# Patient Record
Sex: MALE | Race: WHITE | NOT HISPANIC OR LATINO | Employment: FULL TIME | URBAN - METROPOLITAN AREA
[De-identification: names, ages, dates, MRNs, and addresses within clinical notes are randomized per-mention and may not be internally consistent; named-entity substitution may affect disease eponyms.]

---

## 2022-01-06 ENCOUNTER — OFFICE VISIT (OUTPATIENT)
Dept: DERMATOLOGY | Facility: CLINIC | Age: 47
End: 2022-01-06
Payer: COMMERCIAL

## 2022-01-06 DIAGNOSIS — B36.0 TINEA VERSICOLOR: Primary | ICD-10-CM

## 2022-01-06 PROCEDURE — 99999 PR PBB SHADOW E&M-NEW PATIENT-LVL II: ICD-10-PCS | Mod: PBBFAC,,, | Performed by: STUDENT IN AN ORGANIZED HEALTH CARE EDUCATION/TRAINING PROGRAM

## 2022-01-06 PROCEDURE — 99999 PR PBB SHADOW E&M-NEW PATIENT-LVL II: CPT | Mod: PBBFAC,,, | Performed by: STUDENT IN AN ORGANIZED HEALTH CARE EDUCATION/TRAINING PROGRAM

## 2022-01-06 PROCEDURE — 99204 PR OFFICE/OUTPT VISIT, NEW, LEVL IV, 45-59 MIN: ICD-10-PCS | Mod: 25,S$GLB,, | Performed by: STUDENT IN AN ORGANIZED HEALTH CARE EDUCATION/TRAINING PROGRAM

## 2022-01-06 PROCEDURE — 87220 PR  TISSUE EXAM BY KOH: ICD-10-PCS | Mod: S$GLB,,, | Performed by: STUDENT IN AN ORGANIZED HEALTH CARE EDUCATION/TRAINING PROGRAM

## 2022-01-06 PROCEDURE — 87220 TISSUE EXAM FOR FUNGI: CPT | Mod: S$GLB,,, | Performed by: STUDENT IN AN ORGANIZED HEALTH CARE EDUCATION/TRAINING PROGRAM

## 2022-01-06 PROCEDURE — 99204 OFFICE O/P NEW MOD 45 MIN: CPT | Mod: 25,S$GLB,, | Performed by: STUDENT IN AN ORGANIZED HEALTH CARE EDUCATION/TRAINING PROGRAM

## 2022-01-06 RX ORDER — LOPERAMIDE HYDROCHLORIDE 2 MG/1
2 CAPSULE ORAL 4 TIMES DAILY PRN
COMMUNITY

## 2022-01-06 RX ORDER — BUSPIRONE HYDROCHLORIDE 7.5 MG/1
7.5 TABLET ORAL
COMMUNITY

## 2022-01-06 RX ORDER — FLUCONAZOLE 200 MG/1
200 TABLET ORAL WEEKLY
Qty: 4 TABLET | Refills: 0 | Status: SHIPPED | OUTPATIENT
Start: 2022-01-06 | End: 2022-02-05

## 2022-01-06 RX ORDER — SERTRALINE HYDROCHLORIDE 50 MG/1
50 TABLET, FILM COATED ORAL
COMMUNITY
Start: 2021-02-20

## 2022-01-06 RX ORDER — LIFITEGRAST 50 MG/ML
SOLUTION/ DROPS OPHTHALMIC
COMMUNITY
Start: 2021-01-18

## 2022-01-06 RX ORDER — AZELASTINE HCL 205.5 UG/1
SPRAY NASAL
COMMUNITY
Start: 2021-12-22

## 2022-01-06 RX ORDER — KETOCONAZOLE 20 MG/ML
SHAMPOO, SUSPENSION TOPICAL
Qty: 120 ML | Refills: 12 | Status: SHIPPED | OUTPATIENT
Start: 2022-01-06

## 2022-01-06 RX ORDER — LEFLUNOMIDE 20 MG/1
1 TABLET ORAL DAILY
COMMUNITY
Start: 2021-12-23

## 2022-01-06 NOTE — PROGRESS NOTES
Subjective:       Patient ID:  Tarun Lino is a 46 y.o. male who presents for   Chief Complaint   Patient presents with    Rash     History of Present Illness: The patient presents with chief complaint of persistent rash  Location: trunk, largely lower chest and abdomen  Duration: off and on for months to years  Signs/Symptoms: red, scaly, and dry appearing rash. Worse with heat  Prior treatments: none        Review of Systems   Constitutional: Negative for fever and chills.        Objective:    Physical Exam   Constitutional: He appears well-developed and well-nourished. No distress.   Neurological: He is alert and oriented to person, place, and time. He is not disoriented.   Psychiatric: He has a normal mood and affect.   Skin:   Areas Examined (abnormalities noted in diagram):   Head / Face Inspection Performed  Neck Inspection Performed  Chest / Axilla Inspection Performed  Abdomen Inspection Performed  RUE Inspected  LUE Inspection Performed              Diagram Legend     Erythematous scaling macule/papule c/w actinic keratosis       Vascular papule c/w angioma      Pigmented verrucoid papule/plaque c/w seborrheic keratosis      Yellow umbilicated papule c/w sebaceous hyperplasia      Irregularly shaped tan macule c/w lentigo     1-2 mm smooth white papules consistent with Milia      Movable subcutaneous cyst with punctum c/w epidermal inclusion cyst      Subcutaneous movable cyst c/w pilar cyst      Firm pink to brown papule c/w dermatofibroma      Pedunculated fleshy papule(s) c/w skin tag(s)      Evenly pigmented macule c/w junctional nevus     Mildly variegated pigmented, slightly irregular-bordered macule c/w mildly atypical nevus      Flesh colored to evenly pigmented papule c/w intradermal nevus       Pink pearly papule/plaque c/w basal cell carcinoma      Erythematous hyperkeratotic cursted plaque c/w SCC      Surgical scar with no sign of skin cancer recurrence      Open and closed comedones       Inflammatory papules and pustules      Verrucoid papule consistent consistent with wart     Erythematous eczematous patches and plaques     Dystrophic onycholytic nail with subungual debris c/w onychomycosis     Umbilicated papule    Erythematous-base heme-crusted tan verrucoid plaque consistent with inflamed seborrheic keratosis     Erythematous Silvery Scaling Plaque c/w Psoriasis     See annotation      Assessment / Plan:        Tinea versicolor - KOH positive for fungal elements.   -     fluconazole (DIFLUCAN) 200 MG Tab; Take 1 tablet (200 mg total) by mouth once a week.  Dispense: 4 tablet; Refill: 0  -     ketoconazole (NIZORAL) 2 % shampoo; Apply topically twice a week.  Dispense: 120 mL; Refill: 12             Follow up in about 6 months (around 7/6/2022), or if symptoms worsen or fail to improve.

## 2022-01-06 NOTE — PATIENT INSTRUCTIONS
"Patient Education       Tinea Versicolor   The Basics   Written by the doctors and editors at [facility]   What tinea versicolor? -- Tinea versicolor is a skin infection that causes areas of the skin to change color. The skin might have lighter patches, darker patches, or both light and dark patches.  Tinea versicolor is caused by a fungus. This fungus lives on people's skin and does not cause problems normally. But in some people, the fungus can cause tinea versicolor. This happens more often in people who live where the weather is hot and humid.  Even though tinea versicolor is caused by fungus, it does not spread from one person to another. It is not "contagious."  What are the symptoms of tinea versicolor? -- Tinea versicolor often appears as lots of small spots of color that seem to run into each other and form large patches. The colors can vary from white to light brown, dark brown, gray-black or pinkish red. There can also be a mix of colors.  Tinea versicolor usually shows up on the back, chest, or upper arms. It can also happen on the face or in places where the skin rubs together, such as the armpit.  People sometimes notice this problem more in the summer when affected areas of the skin stand out because they don't get tan from the sun.  Is there a test for tinea versicolor? -- Sometimes a doctor or nurse can tell if you have tinea versicolor by looking at your skin. Other times, they might gently scrape the surface of your skin and look at the scrapings under a microscope. This procedure is usually not painful. If you have tinea versicolor, the doctor or nurse will see the fungus that causes the condition in the scrapings from your skin.  How is tinea versicolor treated? -- Most mild cases of tinea versicolor only need a special antifungal "shampoo" or cream. The shampoo is used like a soap on the affected skin.  If your tinea versicolor covers a large part of your body, or if it doesn't get better with " the shampoo or cream, you might need medicine that comes in pills. Your doctor will decide if you need pills.  Even after you get treated, your skin might not go back to its normal color for several months. This does not mean the treatment didn't work. It just takes time for the skin to heal.  Can tinea versicolor be prevented? -- If the tinea versicolor keeps coming back, there are shampoos or medicines that can help prevent it. Your doctor will work with you on the best treatment plan for your situation.  All topics are updated as new evidence becomes available and our peer review process is complete.  This topic retrieved from [employer] on: Sep 21, 2021.  Topic 18609 Version 5.0  Release: 29.4.2 - C29.263  © 2021 UpToDate, Inc. and/or its affiliates. All rights reserved.  picture 1: Tinea versicolor on the shoulder and arm     Tinea versicolor.  Reproduced with permission from: Storm BROOKS. Storm's Photoguide of Common Skin Disorders, 2nd ed, [employer] Milton[employer]ega, [place] 2003. Copyright © 2003 [employer].  Graphic 52884 Version 2.0    picture 2: Tinea versicolor on the back     Tinea versicolor can appear as a mixture of colors.  Reproduced with permission from: Marcus Smith. [place] of Adult Physical Diagnosis, [employer]ega, [place] 2006. Copyright © 2006 [employer].  Graphic 67596 Version 2.0    picture 3: Tinea versicolor on the chest     Tinea versicolor can appear as dark patches on the skin.  Photo courtesy of Roge Naqvi MD. Reproduced from: Ralph EK, Horacio JA, Nasra GA, Donya PS (Eds). Visual Diagnosis in Pediatrics. [place]: [employer]ega, 2006. Copyright © 2006.  Graphic 37433 Version 2.0    Consumer Information Use and Disclaimer   This information is not specific medical advice and does not replace information you receive from your health care provider. This is only a brief summary of general information. It  does NOT include all information about conditions, illnesses, injuries, tests, procedures, treatments, therapies, discharge instructions or life-style choices that may apply to you. You must talk with your health care provider for complete information about your health and treatment options. This information should not be used to decide whether or not to accept your health care provider's advice, instructions or recommendations. Only your health care provider has the knowledge and training to provide advice that is right for you. The use of this information is governed by the [employer] End User License Agreement, available at https://[email or web address]/en/solutions/[employer]/about/arden.The use of [employer] content is governed by the [employer] Terms of Use. ©2021 UpToDate, Inc. All rights reserved.  Copyright   © 2021 UpToDate, Inc. and/or its affiliates. All rights reserved.

## 2022-03-02 ENCOUNTER — OFFICE VISIT (OUTPATIENT)
Dept: DERMATOLOGY | Facility: CLINIC | Age: 47
End: 2022-03-02
Payer: COMMERCIAL

## 2022-03-02 DIAGNOSIS — L82.1 SEBORRHEIC KERATOSIS: ICD-10-CM

## 2022-03-02 DIAGNOSIS — L72.0 RUPTURED EPIDERMAL CYST: Primary | ICD-10-CM

## 2022-03-02 PROCEDURE — 17110 PR DESTRUCTION BENIGN LESIONS UP TO 14: ICD-10-PCS | Mod: S$GLB,,, | Performed by: STUDENT IN AN ORGANIZED HEALTH CARE EDUCATION/TRAINING PROGRAM

## 2022-03-02 PROCEDURE — 17110 DESTRUCTION B9 LES UP TO 14: CPT | Mod: S$GLB,,, | Performed by: STUDENT IN AN ORGANIZED HEALTH CARE EDUCATION/TRAINING PROGRAM

## 2022-03-02 PROCEDURE — 99999 PR PBB SHADOW E&M-EST. PATIENT-LVL III: CPT | Mod: PBBFAC,,, | Performed by: STUDENT IN AN ORGANIZED HEALTH CARE EDUCATION/TRAINING PROGRAM

## 2022-03-02 PROCEDURE — 99214 PR OFFICE/OUTPT VISIT, EST, LEVL IV, 30-39 MIN: ICD-10-PCS | Mod: 25,S$GLB,, | Performed by: STUDENT IN AN ORGANIZED HEALTH CARE EDUCATION/TRAINING PROGRAM

## 2022-03-02 PROCEDURE — 99214 OFFICE O/P EST MOD 30 MIN: CPT | Mod: 25,S$GLB,, | Performed by: STUDENT IN AN ORGANIZED HEALTH CARE EDUCATION/TRAINING PROGRAM

## 2022-03-02 PROCEDURE — 99999 PR PBB SHADOW E&M-EST. PATIENT-LVL III: ICD-10-PCS | Mod: PBBFAC,,, | Performed by: STUDENT IN AN ORGANIZED HEALTH CARE EDUCATION/TRAINING PROGRAM

## 2022-03-02 RX ORDER — DOXYCYCLINE 100 MG/1
100 CAPSULE ORAL EVERY 12 HOURS
Qty: 14 CAPSULE | Refills: 0 | Status: SHIPPED | OUTPATIENT
Start: 2022-03-02 | End: 2022-03-09

## 2022-03-02 NOTE — PROGRESS NOTES
Subjective:       Patient ID:  Tarun Lino is a 46 y.o. male who presents for   Chief Complaint   Patient presents with    Cyst     Pt has cyst on left side of spin       History of Present Illness: The patient presents with chief complaint of recurring cyst that ruptures.  Location: upper back  Duration: present for many months  Signs/Symptoms: frequently drains, ruptures, becomes painful and uncomfortable  Prior treatments: none    Patient with new complaint of lesion(s) - scaly skin lesion  Location: located on the alar margin of the left nostril  Duration: months  Symptoms: crusted, irritated growth  Relieving factors/Previous treatments: none          Review of Systems   Constitutional: Negative for fever and chills.   Skin: Negative for itching, rash and dry skin.        Objective:    Physical Exam   Constitutional: He appears well-developed and well-nourished. No distress.   Neurological: He is alert and oriented to person, place, and time. He is not disoriented.   Psychiatric: He has a normal mood and affect.   Skin:   Areas Examined (abnormalities noted in diagram):   Head / Face Inspection Performed  Neck Inspection Performed  Back Inspection Performed                   Diagram Legend     Erythematous scaling macule/papule c/w actinic keratosis       Vascular papule c/w angioma      Pigmented verrucoid papule/plaque c/w seborrheic keratosis      Yellow umbilicated papule c/w sebaceous hyperplasia      Irregularly shaped tan macule c/w lentigo     1-2 mm smooth white papules consistent with Milia      Movable subcutaneous cyst with punctum c/w epidermal inclusion cyst      Subcutaneous movable cyst c/w pilar cyst      Firm pink to brown papule c/w dermatofibroma      Pedunculated fleshy papule(s) c/w skin tag(s)      Evenly pigmented macule c/w junctional nevus     Mildly variegated pigmented, slightly irregular-bordered macule c/w mildly atypical nevus      Flesh colored to evenly pigmented papule  c/w intradermal nevus       Pink pearly papule/plaque c/w basal cell carcinoma      Erythematous hyperkeratotic cursted plaque c/w SCC      Surgical scar with no sign of skin cancer recurrence      Open and closed comedones      Inflammatory papules and pustules      Verrucoid papule consistent consistent with wart     Erythematous eczematous patches and plaques     Dystrophic onycholytic nail with subungual debris c/w onychomycosis     Umbilicated papule    Erythematous-base heme-crusted tan verrucoid plaque consistent with inflamed seborrheic keratosis     Erythematous Silvery Scaling Plaque c/w Psoriasis     See annotation      Assessment / Plan:        Ruptured epidermal cyst  -     doxycycline (VIBRAMYCIN) 100 MG Cap; Take 1 capsule (100 mg total) by mouth every 12 (twelve) hours. for 7 days  Dispense: 14 capsule; Refill: 0  -     Given frequency of recurrence, will schedule for surgical removal of the area. Discussed possible recurrence of area.     Seborrheic keratosis  Cryosurgery procedure note:    Verbal consent from the patient is obtained including, but not limited to, risk of hypopigmentation/hyperpigmentation, scar, recurrence of lesion. Liquid nitrogen cryosurgery is applied to 1 lesions to produce a freeze injury. The patient is aware that blisters may form and is instructed on wound care with gentle cleansing and use of vaseline ointment to keep moist until healed. The patient is supplied a handout on cryosurgery and is instructed to call if lesions do not completely resolve.             Follow up in about 4 weeks (around 3/30/2022).

## 2022-03-08 ENCOUNTER — PROCEDURE VISIT (OUTPATIENT)
Dept: DERMATOLOGY | Facility: CLINIC | Age: 47
End: 2022-03-08
Payer: COMMERCIAL

## 2022-03-08 DIAGNOSIS — L72.0 RUPTURED EPIDERMAL CYST: Primary | ICD-10-CM

## 2022-03-08 PROCEDURE — 88304 TISSUE EXAM BY PATHOLOGIST: CPT | Performed by: PATHOLOGY

## 2022-03-08 PROCEDURE — 12032 PR LAYR CLOS WND TRUNK,ARM,LEG 2.6-7.5 CM: ICD-10-PCS | Mod: 51,79,S$GLB, | Performed by: STUDENT IN AN ORGANIZED HEALTH CARE EDUCATION/TRAINING PROGRAM

## 2022-03-08 PROCEDURE — 12032 INTMD RPR S/A/T/EXT 2.6-7.5: CPT | Mod: 51,79,S$GLB, | Performed by: STUDENT IN AN ORGANIZED HEALTH CARE EDUCATION/TRAINING PROGRAM

## 2022-03-08 PROCEDURE — 99499 UNLISTED E&M SERVICE: CPT | Mod: S$GLB,,, | Performed by: STUDENT IN AN ORGANIZED HEALTH CARE EDUCATION/TRAINING PROGRAM

## 2022-03-08 PROCEDURE — 11402 PR EXC SKIN BENIG 1.1-2 CM TRUNK,ARM,LEG: ICD-10-PCS | Mod: 79,S$GLB,, | Performed by: STUDENT IN AN ORGANIZED HEALTH CARE EDUCATION/TRAINING PROGRAM

## 2022-03-08 PROCEDURE — 99499 NO LOS: ICD-10-PCS | Mod: S$GLB,,, | Performed by: STUDENT IN AN ORGANIZED HEALTH CARE EDUCATION/TRAINING PROGRAM

## 2022-03-08 PROCEDURE — 88304 PR  SURG PATH,LEVEL III: ICD-10-PCS | Mod: 26,,, | Performed by: PATHOLOGY

## 2022-03-08 PROCEDURE — 88304 TISSUE EXAM BY PATHOLOGIST: CPT | Mod: 26,,, | Performed by: PATHOLOGY

## 2022-03-08 PROCEDURE — 11402 EXC TR-EXT B9+MARG 1.1-2 CM: CPT | Mod: 79,S$GLB,, | Performed by: STUDENT IN AN ORGANIZED HEALTH CARE EDUCATION/TRAINING PROGRAM

## 2022-03-08 NOTE — PROGRESS NOTES
PROCEDURE: Elliptical excision with intermediate layered repair    ANESTHETIC: 6 cc 1% Lidocaine with Epinephrine 1:100,000    SURGICAL PREP: Betadine and EtOH    SURGEON: Milton King MD    ASSISTANTS:  Kim Arvizu MA    PREOPERATIVE DIAGNOSIS: Ruptured epidermal cyst    POSTOPERATIVE DIAGNOSIS: Ruptured epidermal cyst    PATHOLOGIC DIAGNOSIS: Pending    LOCATION: upper back    INITIAL LESION SIZE: 2 cm    EXCISED DIAMETER: 2 cm    PREPARATION:  The diagnosis, procedure, alternatives, benefits and risks, including but not limited to: drug reactions, pain, scar or cosmetic defect, local sensation disturbances, and/or recurrence of present condition were explained to the patient. The patient elected to proceed.    PROCEDURE:  The area of the upper back was prepped, draped, and anesthetized in the usual sterile fashion. Lesional tissue was carefully marked prior to administration of the anesthesia. An elliptical excision was drawn around the lesion. A fusiform elliptical excision was done with a #15 blade carried down completely through the dermis into the subcutaneous tissues. Then, with a combination of blunt and sharp dissection, the lesion was removed.  The specimen was submitted for histologic evaluation. The operative site was widely undermined in the subcutaneous tissue plane. Then, electrocoagulation was used to obtain hemostasis. Blood loss was minimal. The wound was then approximated in a layered fashion with subcutaneous and intradermal 3-0 Vicryl sutures. The wound was then superficially closed with running 3-0 Ethilon sutures.    The patient tolerated the procedure well.    The area was cleaned and dressed appropriately and the patient was given wound care instructions, as well as an appointment for follow-up evaluation.    LENGTH OF REPAIR: 3 cm    There were no vitals filed for this visit.    Follow up if symptoms worsen or fail to improve.

## 2022-03-21 LAB
FINAL PATHOLOGIC DIAGNOSIS: NORMAL
GROSS: NORMAL
Lab: NORMAL
MICROSCOPIC EXAM: NORMAL

## 2022-03-22 ENCOUNTER — CLINICAL SUPPORT (OUTPATIENT)
Dept: DERMATOLOGY | Facility: CLINIC | Age: 47
End: 2022-03-22
Payer: COMMERCIAL

## 2022-03-22 DIAGNOSIS — Z48.02 VISIT FOR SUTURE REMOVAL: Primary | ICD-10-CM

## 2022-03-22 PROCEDURE — 99999 PR PBB SHADOW E&M-EST. PATIENT-LVL I: CPT | Mod: PBBFAC,,,

## 2022-03-22 PROCEDURE — 99024 PR POST-OP FOLLOW-UP VISIT: ICD-10-PCS | Mod: S$GLB,,, | Performed by: STUDENT IN AN ORGANIZED HEALTH CARE EDUCATION/TRAINING PROGRAM

## 2022-03-22 PROCEDURE — 99999 PR PBB SHADOW E&M-EST. PATIENT-LVL I: ICD-10-PCS | Mod: PBBFAC,,,

## 2022-03-22 PROCEDURE — 99024 POSTOP FOLLOW-UP VISIT: CPT | Mod: S$GLB,,, | Performed by: STUDENT IN AN ORGANIZED HEALTH CARE EDUCATION/TRAINING PROGRAM
